# Patient Record
Sex: MALE | Race: WHITE | NOT HISPANIC OR LATINO | Employment: OTHER | URBAN - METROPOLITAN AREA
[De-identification: names, ages, dates, MRNs, and addresses within clinical notes are randomized per-mention and may not be internally consistent; named-entity substitution may affect disease eponyms.]

---

## 2023-03-03 LAB — KEPPRA: 16 UG/ML (ref 10–40)

## 2024-09-13 ENCOUNTER — OFFICE VISIT (OUTPATIENT)
Dept: NEUROLOGY | Facility: HOSPITAL | Age: 79
End: 2024-09-13
Payer: MEDICARE

## 2024-09-13 VITALS
DIASTOLIC BLOOD PRESSURE: 78 MMHG | BODY MASS INDEX: 24.1 KG/M2 | HEART RATE: 66 BPM | WEIGHT: 182.7 LBS | SYSTOLIC BLOOD PRESSURE: 137 MMHG

## 2024-09-13 DIAGNOSIS — G40.909 SEIZURE DISORDER (MULTI): Primary | ICD-10-CM

## 2024-09-13 DIAGNOSIS — G93.0 INTRACRANIAL ARACHNOID CYST: ICD-10-CM

## 2024-09-13 PROCEDURE — 1126F AMNT PAIN NOTED NONE PRSNT: CPT | Performed by: NURSE PRACTITIONER

## 2024-09-13 PROCEDURE — 99214 OFFICE O/P EST MOD 30 MIN: CPT | Performed by: NURSE PRACTITIONER

## 2024-09-13 PROCEDURE — 1160F RVW MEDS BY RX/DR IN RCRD: CPT | Performed by: NURSE PRACTITIONER

## 2024-09-13 PROCEDURE — 1159F MED LIST DOCD IN RCRD: CPT | Performed by: NURSE PRACTITIONER

## 2024-09-13 PROCEDURE — 1157F ADVNC CARE PLAN IN RCRD: CPT | Performed by: NURSE PRACTITIONER

## 2024-09-13 RX ORDER — LEVETIRACETAM 500 MG/1
1 TABLET ORAL
COMMUNITY
Start: 2024-06-07 | End: 2024-09-13 | Stop reason: SDUPTHER

## 2024-09-13 RX ORDER — LEVETIRACETAM 500 MG/1
500 TABLET ORAL 2 TIMES DAILY
Qty: 180 TABLET | Refills: 3 | Status: SHIPPED | OUTPATIENT
Start: 2024-09-13 | End: 2025-09-13

## 2024-09-13 RX ORDER — IBUPROFEN 200 MG
TABLET ORAL EVERY 6 HOURS PRN
COMMUNITY

## 2024-09-13 ASSESSMENT — ENCOUNTER SYMPTOMS
OCCASIONAL FEELINGS OF UNSTEADINESS: 0
DEPRESSION: 0
LOSS OF SENSATION IN FEET: 0

## 2024-09-13 ASSESSMENT — PAIN SCALES - GENERAL: PAINLEVEL: 0-NO PAIN

## 2024-09-13 NOTE — ASSESSMENT & PLAN NOTE
Continue Keppra, start taking BID again, discussed possible XR dosing instead but relatively more expensive. Pt wanting to go back to BID dosing on this, understanding risk of decreased seizure protection if only taking nightly dosing.

## 2024-09-13 NOTE — PROGRESS NOTES
[facility]    SUBJECTIVE    Bud Jimenez is a 79 y.o. right-handed male who presents with   Chief Complaint   Patient presents with    Seizures        Presents for follow up visit  Visit type: in-clinic visit    HISTORY OF PRESENT ILLNESS    RECAP:  Seen initially as hospital follow up for new onset seizures in January 2023.      Per wife, pt was in bed and wife was in the other room. Wife states she heard him call out and when she went in the room, he was unresponsive. States one of his legs was hanging off the bed on the floor and the other was in bed. She tried to shake him awake but he did not arouse. No witnessed shaking of extremities. Pt had not bitten his tongue. No incontinence of urine. Wife stated pt seemed to be breathing hard. Pt states he only remembers waking up in the ambulance after.     Family states there was an episode about 6 months ago when patient had possibly fell out of bed and was there all night, states he woke up confused after and did not know how he got there. He had a bump on his head. Additionally, there was an episode approx 9 months to 1 year ago where he woke up and found that he had urinated the bed. No witnessed shaking with any of the episodes.     Wife does report that pt snores, no witnessed apnea per wife.     Pt endorses that he does drink a couple of beers with dinner and occasionally will have a couple mixed drinks with hard liquor. Pt did have two beers with dinner the night before this current episode but no hard liquor.     In ED, ethanol level negative. Lactate 3.3. CT head without contrast negative for acute process but shows 4.1 cm left frontal arachnoid cyst. TSH normal. MRI brain w/wo negative for acute process, shows superior left arachnoid cyst measuring 4x3x2 cm. Also shows chronic microvascular ischemia and involutional changes. EEG was normal.     3/3/23 - Pt reports that he is doing well. He stopped drinking alcohol completely  "since being home, his wife has also stopped. Has had no additional episodes.      States that he was \"loopy\" at first on keppra, but this has improved and he has no current side effects.      Pt reports issues during day when he is awake, only at night, states he notes some naps that he may have had an episode in the past.      Doesn't have PCP and not really fond of taking medications. Instructed to keep log of blood pressures to review next visit.      Pt asking if this medication is something he needs to be on lifelong. Discussed with patients risks associated with stopping medications. Pt states understanding, states he will continue to take the medications if they will prevent future seizures and risk of injury or death. Wife expresses that she prefers him to stay on them as well because it gives her peace of mind.      Discussed what to do if patient has another seizure episode with wife, discussed risk of provoked seizures in the future with medications and illness. Wife and patient both state understanding.     3/6/23 - Keppra level therapeutic at 16.     9/15/23- Presents with wife for follow up visit in-clinic.     States he is still doing fine overall on medication. Mild fatigue through the day that he notices but it is tolerable and states no brain fog issues. Patient denies any auras, shaking, jerking, convulsions, loss of time, zoning out, waking up with tongue or cheek bites, incontinence or unexplained bruising. Has only missed an occasional dose on his medication.      C/o trouble sleeping all night. Waking up 2:30-3 am. States has a lot of stress this time of year. Will wake up to go to bathroom. Will generally go to bed around 8 pm. May take 1-2 hour nap after working and he feels better. Does endorse racing thoughts preventing him from falling back to sleep at night.      Still does not have a PCP. BP elevated today but improved from last visit. Denies any SOB, HA or chest pain. Denies any BLE " edema.      C/o right wrist pain from his arthritis. Recently flared up. Was just taking turmeric that he started last spring and that was helping until now. He started taking naproxen again, states he previously had GI bleeding issues with this but was taking on empty stomach with alcohol in the past. Not having any issues with this in past few weeks. A golf kirstie recommended he try voltaren gel but during the work day he cannot apply this. Does have compression wrist sleeve and brace that helps while he is working.      Only drinking about 1/2 beer two times per week maximum. Him and his wife will split it.    09/13/24 -     Patient denies any auras, shaking, jerking, convulsions, loss of time, zoning out, waking up with tongue or cheek bites, incontinence or unexplained bruising.    Currently taking Keppra only at night 500 mg. States having a lot of swelling from arthritis so taking advil in the morning and just does not like to take too many pills. States he does eat something small with the advil. Tried Voltaren but noted minimal difference. Willing to start taking Keppra BID again.     Not wanting repeat MRI/CT head imaging for cyst monitoring.      BP is better today.     Summer season [place] slowing [place] now [place] the farm. States his arthritis is surprisingly better this year than last year. Enjoys golfing.     REVIEW OF SYSTEMS:  10 point review of systems performed and is negative except as noted in the HPI.     History reviewed. No pertinent past medical history.    No relevant family history has been documented for this patient.    History reviewed. No pertinent surgical history.    Social History     Substance and Sexual Activity   Drug Use Never     Tobacco Use: High Risk (9/13/2024)    Patient History     Smoking Tobacco Use: Some Days     Smokeless Tobacco Use: Former     Passive Exposure: Not on file     Alcohol Use: Not on file       Current Outpatient Medications   Medication Instructions     ibuprofen 200 mg tablet oral, Every 6 hours PRN    levETIRAcetam (KEPPRA) 500 mg, oral, 2 times daily         OBJECTIVE    /78   Pulse 66   Wt 82.9 kg (182 lb 11.2 oz)   BMI 24.10 kg/m²       Physical Exam  Psychiatric:         Speech: Speech normal.       Neurological Exam  Mental Status  Awake, alert and oriented to person, place and time. Recent and remote memory are intact. Speech is normal. Language is fluent with no aphasia. Attention and concentration are normal. Fund of knowledge is appropriate for level of education.    Cranial Nerves  CN II-XII grossly intact.    Motor  Normal muscle bulk throughout. No fasciculations present. Normal muscle tone. No abnormal involuntary movements.  Strength at least antigravity throughout.    Sensory  Light touch is normal in upper and lower extremities.     Coordination  Right: Finger-to-nose normal.Left: Finger-to-nose normal.    Gait  Casual gait is normal including stance, stride, and arm swing.        MR BRAIN W AND WO IV CONTRAST 01/11/2023    Narrative  MRN: [id number]  Patient Name: MARIA GUADALUPE MAYS    STUDY:  MRI BRAIN W/WO CONTRAST; 1/11/2023 10:18 am    INDICATION:  new-onset seizures ;    COMPARISON:  CT brain 01/10/2023    ACCESSION NUMBER(S):  [id number]    ORDERING CLINICIAN:  SUSAN CANALES    TECHNIQUE:  Routine brain MRI protocol without and with contrast including  diffusion and gradient echo images.  Contrast: 18 mL IV Dotarem    FINDINGS:  RESULT:    Acute Change: There is no evidence of restricted diffusion to suggest  an acute infarct.    Hemorrhage: No evidence of prior parenchymal hemorrhage on the  gradient echo images.    Mass Lesion/ Mass Effect: No evidence of an intracranial mass or  extra-axial fluid collection. 4.2 x 3.2 x 2.4 cm superior left  frontal arachnoid cyst. Mild local mass effect without significant  midline shift. No suspicious parenchymal or leptomeningeal  enhancement.    Chronic Change: Scattered foci of hyperintense  signal on T2 weighted  and FLAIR images are noted involving the supratentorial white matter  are nonspecific but likely represent mild microvascular ischemia.    Parenchyma: Moderate generalized volume loss.    Ventricles: Ventriculomegaly concordant with the degree of  generalized volume loss.    Skull Base: Hypothalamic and pituitary region are grossly normal.  Craniocervical junction is normal.  No significant marrow replacement  process.    Vasculature: Major intracranial arterial structures, and dural venous  sinuses show typical flow void, suggesting patency by spin echo  criteria.    Other: Mild paranasal sinus mucosal thickening. Trace left mastoid  effusions. Right mastoid air cells are clear. Orbits are unremarkable.    Impression  1. No acute intracranial abnormality; no acute infarct, intracranial  hemorrhage or extra-axial collection.  2. No intracranial mass, parenchymal or leptomeningeal enhancement.  3. 4.2 x 3.2 x 2.4 cm superior left arachnoid cyst.  4. Chronic microvascular ischemia and involutional changes.      Lab Results   Component Value Date    WBC 5.5 01/12/2023    RBC 4.18 (L) 01/12/2023    HGB 13.2 (L) 01/12/2023    HCT 39.1 (L) 01/12/2023     01/12/2023     01/12/2023    K 3.4 (L) 01/12/2023     01/12/2023    BUN 13 01/12/2023    CREATININE 0.69 01/12/2023    CALCIUM 8.4 (L) 01/12/2023    ALKPHOS 64 01/10/2023    AST 17 01/10/2023    ALT 11 01/10/2023    MG 1.82 01/12/2023    TSH 0.58 01/11/2023          ASSESSMENT & PLAN  Problem List Items Addressed This Visit       Seizure disorder (Multi) - Primary    Overview     ? Multiple nocturnal seizures prior to admission in January 2023  Had witnessed GTC in ED January 2023.   EEG wnl  Now on levetiracetam 500 mg BID         Current Assessment & Plan     Continue Keppra, start taking BID again, discussed possible XR dosing instead but relatively more expensive. Pt wanting to go back to BID dosing on this, understanding risk  of decreased seizure protection if only taking nightly dosing.          Relevant Medications    levETIRAcetam (Keppra) 500 mg tablet    Other Relevant Orders    Follow Up In Neurology    Intracranial arachnoid cyst    Overview     L frontal  Incidental finding on HCT January 2023.          Relevant Orders    Follow Up In Neurology         FU in 1 year         Melodie Olsen, APRN-CNP